# Patient Record
Sex: MALE | Race: WHITE | URBAN - METROPOLITAN AREA
[De-identification: names, ages, dates, MRNs, and addresses within clinical notes are randomized per-mention and may not be internally consistent; named-entity substitution may affect disease eponyms.]

---

## 2021-05-25 ENCOUNTER — RECORDS - HEALTHEAST (OUTPATIENT)
Dept: ADMINISTRATIVE | Facility: CLINIC | Age: 61
End: 2021-05-25

## 2021-06-01 ENCOUNTER — RECORDS - HEALTHEAST (OUTPATIENT)
Dept: ADMINISTRATIVE | Facility: CLINIC | Age: 61
End: 2021-06-01

## 2021-06-09 ENCOUNTER — RECORDS - HEALTHEAST (OUTPATIENT)
Dept: ADMINISTRATIVE | Facility: CLINIC | Age: 61
End: 2021-06-09

## 2025-05-26 ENCOUNTER — HOSPITAL ENCOUNTER (EMERGENCY)
Facility: CLINIC | Age: 65
Discharge: HOME | End: 2025-05-26
Attending: FAMILY MEDICINE | Admitting: FAMILY MEDICINE
Payer: COMMERCIAL

## 2025-05-26 ENCOUNTER — APPOINTMENT (OUTPATIENT)
Dept: GENERAL RADIOLOGY | Facility: CLINIC | Age: 65
End: 2025-05-26
Attending: FAMILY MEDICINE
Payer: COMMERCIAL

## 2025-05-26 ENCOUNTER — APPOINTMENT (OUTPATIENT)
Dept: CT IMAGING | Facility: CLINIC | Age: 65
End: 2025-05-26
Attending: FAMILY MEDICINE
Payer: COMMERCIAL

## 2025-05-26 VITALS
HEART RATE: 97 BPM | WEIGHT: 208 LBS | SYSTOLIC BLOOD PRESSURE: 133 MMHG | OXYGEN SATURATION: 96 % | RESPIRATION RATE: 18 BRPM | DIASTOLIC BLOOD PRESSURE: 75 MMHG

## 2025-05-26 DIAGNOSIS — S16.1XXA CERVICAL STRAIN, INITIAL ENCOUNTER: ICD-10-CM

## 2025-05-26 DIAGNOSIS — S42.001A CLOSED DISPLACED FRACTURE OF RIGHT CLAVICLE, UNSPECIFIED PART OF CLAVICLE, INITIAL ENCOUNTER: ICD-10-CM

## 2025-05-26 DIAGNOSIS — S09.90XA CLOSED HEAD INJURY, INITIAL ENCOUNTER: ICD-10-CM
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
## 2025-05-26 PROBLEM — I83.813 VARICOSE VEINS OF BOTH LOWER EXTREMITIES WITH PAIN: Status: ACTIVE | Noted: 2025-05-26

## 2025-05-26 LAB
ALBUMIN SERPL BCG-MCNC: 4.7 G/DL (ref 3.5–5.2)
ALP SERPL-CCNC: 117 U/L (ref 40–150)
ALT SERPL W P-5'-P-CCNC: 42 U/L (ref 0–70)
ANION GAP SERPL CALCULATED.3IONS-SCNC: 12 MMOL/L (ref 7–15)
AST SERPL W P-5'-P-CCNC: 50 U/L (ref 0–45)
BASOPHILS # BLD AUTO: 0 10E3/UL (ref 0–0.2)
BASOPHILS NFR BLD AUTO: 0 %
BILIRUB SERPL-MCNC: 0.9 MG/DL
BUN SERPL-MCNC: 12.7 MG/DL (ref 8–23)
CALCIUM SERPL-MCNC: 9.7 MG/DL (ref 8.8–10.4)
CHLORIDE SERPL-SCNC: 97 MMOL/L (ref 98–107)
CREAT SERPL-MCNC: 0.81 MG/DL (ref 0.67–1.17)
EGFRCR SERPLBLD CKD-EPI 2021: >90 ML/MIN/1.73M2
EOSINOPHIL # BLD AUTO: 0 10E3/UL (ref 0–0.7)
EOSINOPHIL NFR BLD AUTO: 0 %
ERYTHROCYTE [DISTWIDTH] IN BLOOD BY AUTOMATED COUNT: 12.8 % (ref 10–15)
GLUCOSE SERPL-MCNC: 275 MG/DL (ref 70–99)
HCO3 SERPL-SCNC: 25 MMOL/L (ref 22–29)
HCT VFR BLD AUTO: 46.3 % (ref 40–53)
HGB BLD-MCNC: 14.9 G/DL (ref 13.3–17.7)
HOLD SPECIMEN: NORMAL
HOLD SPECIMEN: NORMAL
IMM GRANULOCYTES # BLD: 0.2 10E3/UL
IMM GRANULOCYTES NFR BLD: 2 %
LYMPHOCYTES # BLD AUTO: 0.8 10E3/UL (ref 0.8–5.3)
LYMPHOCYTES NFR BLD AUTO: 5 %
MCH RBC QN AUTO: 28.3 PG (ref 26.5–33)
MCHC RBC AUTO-ENTMCNC: 32.2 G/DL (ref 31.5–36.5)
MCV RBC AUTO: 88 FL (ref 78–100)
MONOCYTES # BLD AUTO: 1 10E3/UL (ref 0–1.3)
MONOCYTES NFR BLD AUTO: 7 %
NEUTROPHILS # BLD AUTO: 13 10E3/UL (ref 1.6–8.3)
NEUTROPHILS NFR BLD AUTO: 86 %
NRBC # BLD AUTO: 0 10E3/UL
NRBC BLD AUTO-RTO: 0 /100
PLATELET # BLD AUTO: 300 10E3/UL (ref 150–450)
POTASSIUM SERPL-SCNC: 3.9 MMOL/L (ref 3.4–5.3)
PROT SERPL-MCNC: 7.2 G/DL (ref 6.4–8.3)
RBC # BLD AUTO: 5.27 10E6/UL (ref 4.4–5.9)
SODIUM SERPL-SCNC: 134 MMOL/L (ref 135–145)
WBC # BLD AUTO: 15.1 10E3/UL (ref 4–11)

## 2025-05-26 PROCEDURE — 96376 TX/PRO/DX INJ SAME DRUG ADON: CPT

## 2025-05-26 PROCEDURE — 250N000011 HC RX IP 250 OP 636: Performed by: FAMILY MEDICINE

## 2025-05-26 PROCEDURE — 72128 CT CHEST SPINE W/O DYE: CPT

## 2025-05-26 PROCEDURE — 36415 COLL VENOUS BLD VENIPUNCTURE: CPT | Performed by: FAMILY MEDICINE

## 2025-05-26 PROCEDURE — 71275 CT ANGIOGRAPHY CHEST: CPT

## 2025-05-26 PROCEDURE — 99291 CRITICAL CARE FIRST HOUR: CPT | Mod: 25

## 2025-05-26 PROCEDURE — 250N000011 HC RX IP 250 OP 636: Mod: JZ | Performed by: FAMILY MEDICINE

## 2025-05-26 PROCEDURE — 96375 TX/PRO/DX INJ NEW DRUG ADDON: CPT

## 2025-05-26 PROCEDURE — 96374 THER/PROPH/DIAG INJ IV PUSH: CPT | Mod: 59

## 2025-05-26 PROCEDURE — 82310 ASSAY OF CALCIUM: CPT | Performed by: FAMILY MEDICINE

## 2025-05-26 PROCEDURE — 73502 X-RAY EXAM HIP UNI 2-3 VIEWS: CPT

## 2025-05-26 PROCEDURE — 72131 CT LUMBAR SPINE W/O DYE: CPT

## 2025-05-26 PROCEDURE — 258N000003 HC RX IP 258 OP 636: Performed by: FAMILY MEDICINE

## 2025-05-26 PROCEDURE — 70450 CT HEAD/BRAIN W/O DYE: CPT

## 2025-05-26 PROCEDURE — 99285 EMERGENCY DEPT VISIT HI MDM: CPT | Performed by: FAMILY MEDICINE

## 2025-05-26 PROCEDURE — 96361 HYDRATE IV INFUSION ADD-ON: CPT

## 2025-05-26 PROCEDURE — 73030 X-RAY EXAM OF SHOULDER: CPT | Mod: RT

## 2025-05-26 PROCEDURE — 250N000009 HC RX 250: Performed by: FAMILY MEDICINE

## 2025-05-26 PROCEDURE — 85004 AUTOMATED DIFF WBC COUNT: CPT | Performed by: FAMILY MEDICINE

## 2025-05-26 PROCEDURE — 72125 CT NECK SPINE W/O DYE: CPT

## 2025-05-26 RX ORDER — OXYCODONE HYDROCHLORIDE 5 MG/1
5 TABLET ORAL EVERY 6 HOURS PRN
Qty: 10 TABLET | Refills: 0 | Status: SHIPPED | OUTPATIENT
Start: 2025-05-26

## 2025-05-26 RX ORDER — HYDROMORPHONE HYDROCHLORIDE 1 MG/ML
INJECTION, SOLUTION INTRAMUSCULAR; INTRAVENOUS; SUBCUTANEOUS
Status: COMPLETED
Start: 2025-05-26 | End: 2025-05-26

## 2025-05-26 RX ORDER — OXYCODONE HYDROCHLORIDE 5 MG/1
5 TABLET ORAL EVERY 6 HOURS PRN
Qty: 12 TABLET | Refills: 0 | Status: SHIPPED | OUTPATIENT
Start: 2025-05-26 | End: 2025-05-26

## 2025-05-26 RX ORDER — HYDROMORPHONE HYDROCHLORIDE 1 MG/ML
0.5 INJECTION, SOLUTION INTRAMUSCULAR; INTRAVENOUS; SUBCUTANEOUS
Refills: 0 | Status: DISCONTINUED | OUTPATIENT
Start: 2025-05-26 | End: 2025-05-26 | Stop reason: HOSPADM

## 2025-05-26 RX ORDER — HYDROMORPHONE HYDROCHLORIDE 1 MG/ML
0.5 INJECTION, SOLUTION INTRAMUSCULAR; INTRAVENOUS; SUBCUTANEOUS ONCE
Refills: 0 | Status: COMPLETED | OUTPATIENT
Start: 2025-05-26 | End: 2025-05-26

## 2025-05-26 RX ORDER — IOPAMIDOL 755 MG/ML
102 INJECTION, SOLUTION INTRAVASCULAR ONCE
Status: COMPLETED | OUTPATIENT
Start: 2025-05-26 | End: 2025-05-26

## 2025-05-26 RX ORDER — ONDANSETRON 2 MG/ML
4 INJECTION INTRAMUSCULAR; INTRAVENOUS ONCE
Status: COMPLETED | OUTPATIENT
Start: 2025-05-26 | End: 2025-05-26

## 2025-05-26 RX ADMIN — IOPAMIDOL 102 ML: 755 INJECTION, SOLUTION INTRAVENOUS at 14:52

## 2025-05-26 RX ADMIN — HYDROMORPHONE HYDROCHLORIDE 0.5 MG: 1 INJECTION, SOLUTION INTRAMUSCULAR; INTRAVENOUS; SUBCUTANEOUS at 13:40

## 2025-05-26 RX ADMIN — SODIUM CHLORIDE 100 ML: 9 INJECTION, SOLUTION INTRAVENOUS at 14:52

## 2025-05-26 RX ADMIN — ONDANSETRON 4 MG: 2 INJECTION, SOLUTION INTRAMUSCULAR; INTRAVENOUS at 13:38

## 2025-05-26 RX ADMIN — HYDROMORPHONE HYDROCHLORIDE 0.5 MG: 1 INJECTION, SOLUTION INTRAMUSCULAR; INTRAVENOUS; SUBCUTANEOUS at 15:03

## 2025-05-26 RX ADMIN — SODIUM CHLORIDE 1000 ML: 0.9 INJECTION, SOLUTION INTRAVENOUS at 13:42

## 2025-05-26 ASSESSMENT — COLUMBIA-SUICIDE SEVERITY RATING SCALE - C-SSRS
1. IN THE PAST MONTH, HAVE YOU WISHED YOU WERE DEAD OR WISHED YOU COULD GO TO SLEEP AND NOT WAKE UP?: NO
6. HAVE YOU EVER DONE ANYTHING, STARTED TO DO ANYTHING, OR PREPARED TO DO ANYTHING TO END YOUR LIFE?: NO
2. HAVE YOU ACTUALLY HAD ANY THOUGHTS OF KILLING YOURSELF IN THE PAST MONTH?: NO

## 2025-05-26 ASSESSMENT — ACTIVITIES OF DAILY LIVING (ADL)
ADLS_ACUITY_SCORE: 41

## 2025-05-26 NOTE — ED PROVIDER NOTES
Spoke with [facility] RN who requested change in prescription destination after patient was discharged with oxycodone sent to hospital pharmacy- now closed. To help facilitate care needs re-routed oxycodone prescription to [facility]. Patient was discharged with plan for pain management plan to include oxycodone as directed during ED care course. See ED note for further details of care provided. Patient was not seen or examined.     Angelo Muniz MD  05/26/25 [id number]

## 2025-05-26 NOTE — ED PROVIDER NOTES
"  History     Chief Complaint   Patient presents with    Fall   Shoulder pain, fall from ladder  HPI  Andrew J Lesch is a 65 year old male, past medical history significant for varicose veins, hyperlipidemia, diabetes, allergic rhinitis, sensorineural hearing loss, osteoarthritis, presents to the emergency department by way of the urgent care with concerns of primarily right shoulder pain after a fall from a ladder shortly prior to arrival.  The patient was seen in urgent care briefly and transferred over the emergency department where I saw him promptly as a trauma evaluation.  Initial history was obtained from the patient's wife who states that he was outside on a ladder about 10 feet up with a chainsaw trying to cut a branch when he thinks the branch snapped back hitting him in the right shoulder causing him to fall backwards off the ladder and landed on his back on the grass.  He does not know this for sure but his wife suspects that she found him actually on top of the ladder on his back, briefly stunned and not answering questions although he was breathing and his eyes were open he then \"snapped out of it\" and started complaining of pain in his right shoulder and a little bit in his neck and a little bit in his chest and abdomen.  The patient complains of a mild headache, his vision does seem a little bit blurry, primarily notes pain in his right shoulder, also little bit in the neck where he states he had a previous neck fracture that was managed nonoperatively, he identifies a little bit of discomfort in his right anterior chest and right upper abdomen area.  He also identifies pain in his right hip open was able to get up off the ground with minimal assistance from his wife and ambulate to the emergency department from parking lot from his car without difficulty.  He has taken nothing for pain.  He denies any shortness of breath nausea or vomiting.  Denies trauma to the lower extremities and has no pain in " those areas aside from the 1 area in the right hip.    Allergies:  Allergies   Allergen Reactions    Loperamide     Penicillins     Sulfa Antibiotics        Problem List:    Patient Active Problem List    Diagnosis Date Noted    Varicose veins of both lower extremities with pain 05/26/2025     Priority: Medium    Hyperlipidemia 07/23/2015     Priority: Medium    Diabetes mellitus (H) 08/26/2014     Priority: Medium    Allergic rhinitis 04/14/2014     Priority: Medium    Sensorineural hearing loss, unilateral 01/19/2012     Priority: Medium    Osteoarthritis of knee 05/29/2011     Priority: Medium        Past Medical History:    No past medical history on file.    Past Surgical History:    No past surgical history on file.    Family History:    No family history on file.    Social History:  Marital Status:   [2]        Medications:    oxyCODONE (ROXICODONE) 5 MG tablet          Review of Systems   All other systems reviewed and are negative.      Physical Exam   BP: 132/87  Pulse: 92  Resp: 18  Weight: 94.3 kg (208 lb)  SpO2: 96 %      Physical Exam  Vitals and nursing note reviewed.   Constitutional:       General: He is not in acute distress.     Appearance: Normal appearance. He is normal weight. He is not ill-appearing.   HENT:      Head: Normocephalic and atraumatic.      Comments: No hemotympanum, no raccoon's or magana's sign     Right Ear: Tympanic membrane, ear canal and external ear normal.      Left Ear: Tympanic membrane, ear canal and external ear normal.      Nose: Nose normal.      Mouth/Throat:      Mouth: Mucous membranes are dry.      Pharynx: Oropharynx is clear.   Eyes:      Extraocular Movements: Extraocular movements intact.      Conjunctiva/sclera: Conjunctivae normal.      Pupils: Pupils are equal, round, and reactive to light.   Neck:      Comments: Tender to palpation in the midline cervical spine.  C-collar to be placed  Cardiovascular:      Rate and Rhythm: Normal rate and regular  rhythm.      Pulses: Normal pulses.      Heart sounds: Normal heart sounds.   Pulmonary:      Effort: Pulmonary effort is normal.      Breath sounds: Normal breath sounds.   Chest:      Chest wall: Tenderness present.       Abdominal:      General: Bowel sounds are normal.      Palpations: Abdomen is soft.   Musculoskeletal:      Cervical back: Tenderness present.        Back:       Comments: Mildly tender to palpation diffusely in the area diagram   Skin:     General: Skin is warm and dry.      Capillary Refill: Capillary refill takes less than 2 seconds.   Neurological:      General: No focal deficit present.      Mental Status: He is alert and oriented to person, place, and time.         ED Course        Procedures                Results for orders placed or performed during the hospital encounter of 05/26/25 (from the past 24 hours)   [place] Draw    Narrative    The following orders were created for panel order [place] Draw.  Procedure                               Abnormality         Status                     ---------                               -----------         ------                     Extra Green Top (Lithiu...[[id number]]                      Final result               Extra Purple Top Tube[[id number]]                           Final result                 Please view results for these tests on the individual orders.   Extra Green Top (Lithium Heparin) Tube   Result Value Ref Range    Hold Specimen JIC    Extra Purple Top Tube   Result Value Ref Range    Hold Specimen JIC    CBC with platelets, differential    Narrative    The following orders were created for panel order CBC with platelets, differential.  Procedure                               Abnormality         Status                     ---------                               -----------         ------                     CBC with platelets and ...[[id number]]  Abnormal            Final result                 Please view results for these  tests on the individual orders.   Comprehensive metabolic panel   Result Value Ref Range    Sodium 134 (L) 135 - 145 mmol/L    Potassium 3.9 3.4 - 5.3 mmol/L    Carbon Dioxide (CO2) 25 22 - 29 mmol/L    Anion Gap 12 7 - 15 mmol/L    Urea Nitrogen 12.7 8.0 - 23.0 mg/dL    Creatinine 0.81 0.67 - 1.17 mg/dL    GFR Estimate >90 >60 mL/min/1.73m2    Calcium 9.7 8.8 - 10.4 mg/dL    Chloride 97 (L) 98 - 107 mmol/L    Glucose 275 (H) 70 - 99 mg/dL    Alkaline Phosphatase 117 40 - 150 U/L    AST 50 (H) 0 - 45 U/L    ALT 42 0 - 70 U/L    Protein Total 7.2 6.4 - 8.3 g/dL    Albumin 4.7 3.5 - 5.2 g/dL    Bilirubin Total 0.9 <=1.2 mg/dL   CBC with platelets and differential   Result Value Ref Range    WBC Count 15.1 (H) 4.0 - 11.0 10e3/uL    RBC Count 5.27 4.40 - 5.90 10e6/uL    Hemoglobin 14.9 13.3 - 17.7 g/dL    Hematocrit 46.3 40.0 - 53.0 %    MCV 88 78 - 100 fL    MCH 28.3 26.5 - 33.0 pg    MCHC 32.2 31.5 - 36.5 g/dL    RDW 12.8 10.0 - 15.0 %    Platelet Count 300 150 - 450 10e3/uL    % Neutrophils 86 %    % Lymphocytes 5 %    % Monocytes 7 %    % Eosinophils 0 %    % Basophils 0 %    % Immature Granulocytes 2 %    NRBCs per 100 WBC 0 <1 /100    Absolute Neutrophils 13.0 (H) 1.6 - 8.3 10e3/uL    Absolute Lymphocytes 0.8 0.8 - 5.3 10e3/uL    Absolute Monocytes 1.0 0.0 - 1.3 10e3/uL    Absolute Eosinophils 0.0 0.0 - 0.7 10e3/uL    Absolute Basophils 0.0 0.0 - 0.2 10e3/uL    Absolute Immature Granulocytes 0.2 <=0.4 10e3/uL    Absolute NRBCs 0.0 10e3/uL   Shoulder XR, 2 view, right    Narrative    EXAM: XR SHOULDER RIGHT 2 VIEWS  LOCATION: [facility]  DATE: 5/26/2025    INDICATION: Right shoulder pain, distal one third clavicle fracture suspected, fell from approximately 10 feet up a ladder  COMPARISON: None.      Impression    IMPRESSION: Acute mildly displaced fracture of the distal right clavicular shaft with slight inferior displacement of the distal clavicular fracture fragment. No angulation.    XR Hip Right 2-3 Views    Narrative    EXAM: XR HIP RIGHT 2-3 VIEWS  LOCATION: [facility]  DATE: 5/26/2025    INDICATION: Right hip pain, fell 10 feet off a ladder.  COMPARISON: 12/05/2023.      Impression    IMPRESSION:   Bones are demineralized. There is a small curvilinear bone fragment which appears to arise from the posterior acetabular rim which is chronic, present on the 12/05/2023 exam. No evidence of an acute displaced fracture. Minimal right hip arthrosis. No   dislocation.   XR Shoulder Left 2 Views    Narrative    EXAM: XR SHOULDER LEFT 2 VIEWS  LOCATION: [facility]  DATE: 5/26/2025    INDICATION: pain, fall from ladder  COMPARISON: None.      Impression    IMPRESSION: No fracture or dislocation. Mild degenerative changes at the AC joint.   CT Head w/o Contrast    Narrative    EXAM: CT HEAD W/O CONTRAST  LOCATION: [facility]  DATE: 5/26/2025    INDICATION: Trauma  COMPARISON: None.  TECHNIQUE: Routine CT Head without IV contrast. Multiplanar reformats. Dose reduction techniques were used.    FINDINGS:  INTRACRANIAL CONTENTS: No evidence of acute intracranial hemorrhage or mass effect. Foci of decreased attenuation within the cerebral hemispheric white matter which are nonspecific, though most commonly ascribed to chronic small vessel ischemic disease.   The ventricles and sulci are prominent consistent with mild brain parenchymal volume loss. Gray-white matter differentiation is maintained. The basilar cisterns are patent.    VISUALIZED ORBITS/SINUSES/MASTOIDS: The globes are unremarkable. The partially imaged paranasal sinuses, mastoid air cells and middle ear cavities are unremarkable.     BONES/SOFT TISSUES: The visualized skull base and calvarium are unremarkable.      Impression    IMPRESSION:    1.  No evidence of acute intracranial hemorrhage or mass effect.  2.  Mild nonspecific white matter changes.  3.  Mild  brain parenchymal volume loss.   CT Cervical Spine w/o Contrast    Narrative    EXAM: CT CERVICAL SPINE W/O CONTRAST  LOCATION: [facility]  DATE: 5/26/2025    INDICATION: Trauma  COMPARISON: None.  TECHNIQUE: Routine CT Cervical Spine without IV contrast. Multiplanar reformats. Dose reduction techniques were used.    FINDINGS:  No evidence of acute fracture or subluxation of the cervical spine by CT imaging. 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 Stature and alignment. Multilevel No extraspinal abnormality.     Craniovertebral junction and C1-C2: Normal.    C2-C3: No posterior disc bulge or spinal canal narrowing. No neural foraminal narrowing.     C3-C4: No posterior disc bulge or spinal canal narrowing. No neural foraminal narrowing.     C4-C5: No posterior disc bulge or spinal canal narrowing. Uncovertebral joint disease and facet arthropathy with severe bilateral neural foraminal narrowing.     C5-C6: Mild loss of disc height. Broad bar disc osteophyte complex with mild spinal canal narrowing. Uncovertebral joint disease and facet arthropathy with severe bilateral neural foraminal narrowing.    C6-C7: No posterior disc bulge or spinal canal narrowing. Uncovertebral joint disease and facet arthropathy with severe bilateral neural foraminal narrowing.     C7-T1: No posterior disc bulge or spinal canal narrowing. No neural foraminal narrowing.       Impression    IMPRESSION:  1.  No evidence of acute fracture or subluxation of the cervical spine by CT imaging.  2.  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  3.  Degenerative cervical spondylosis as described above..   CT Thoracic Spine w/o Contrast    Narrative    EXAM: CT THORACIC SPINE W/O CONTRAST, CT LUMBAR SPINE W/O CONTRAST  LOCATION: [facility]  DATE: 5/26/2025    INDICATION: Trauma  COMPARISON: None.  TECHNIQUE:   1.  Thoracic spine CT without  IV contrast. Dose reduction techniques were used.  2.  Lumbar spine CT without IV contrast. Dose reduction techniques were used.    FINDINGS:  Thoracic spine CT:  No evidence of acute fracture or subluxation of the thoracic spine by CT imaging. The vertebral bodies of the thoracic spine have normal stature and alignment. The disc spaces are well-maintained. No significant degenerative changes.    No significant posterior disc bulge, spinal canal, or neural foraminal narrowing at any level within the thoracic spine.    Lumbar spine CT:  No evidence of acute fracture or subluxation of the thoracic spine by CT imaging. The vertebral bodies of the thoracic spine have normal stature and alignment. Multilevel degenerative disc disease of the thoracic spine with disc height loss, most   pronounced at L4/L5 and L5/S1.    The partially imaged intra-abdominal contents are unremarkable.    T12/L1:  No posterior disc bulge or spinal canal narrowing. No neural foraminal narrowing.    L1/L2:  No posterior disc bulge or spinal canal narrowing. No neural foraminal narrowing.    L2/L3:  No posterior disc bulge or spinal canal narrowing. No neural foraminal narrowing.    L3/L4:  Symmetric disc bulge and moderate facet arthropathy with mild spinal canal narrowing. Mild bilateral neural foraminal narrowing.      L4/L5:  Symmetric disc bulge and moderate facet arthropathy with mild spinal canal narrowing. Mild bilateral neural foraminal narrowing.     L5/S1: Symmetric disc bulge and moderate facet arthropathy without significant spinal canal narrowing. Severe bilateral neural foraminal narrowing.       Impression    IMPRESSION:    Thoracic Spine CT  1.  No evidence of acute fracture or subluxation of the thoracic spine by CT imaging.    Lumbar Spine CT:  1.  No evidence of acute fracture or subluxation of the lumbar spine by CT imaging.  2.  Degenerative lumbar spondylosis as described above.   CT Lumbar Spine w/o Contrast    Narrative     EXAM: CT THORACIC SPINE W/O CONTRAST, CT LUMBAR SPINE W/O CONTRAST  LOCATION: [facility]  DATE: 5/26/2025    INDICATION: Trauma  COMPARISON: None.  TECHNIQUE:   1.  Thoracic spine CT without IV contrast. Dose reduction techniques were used.  2.  Lumbar spine CT without IV contrast. Dose reduction techniques were used.    FINDINGS:  Thoracic spine CT:  No evidence of acute fracture or subluxation of the thoracic spine by CT imaging. The vertebral bodies of the thoracic spine have normal stature and alignment. The disc spaces are well-maintained. No significant degenerative changes.    No significant posterior disc bulge, spinal canal, or neural foraminal narrowing at any level within the thoracic spine.    Lumbar spine CT:  No evidence of acute fracture or subluxation of the thoracic spine by CT imaging. The vertebral bodies of the thoracic spine have normal stature and alignment. Multilevel degenerative disc disease of the thoracic spine with disc height loss, most   pronounced at L4/L5 and L5/S1.    The partially imaged intra-abdominal contents are unremarkable.    T12/L1:  No posterior disc bulge or spinal canal narrowing. No neural foraminal narrowing.    L1/L2:  No posterior disc bulge or spinal canal narrowing. No neural foraminal narrowing.    L2/L3:  No posterior disc bulge or spinal canal narrowing. No neural foraminal narrowing.    L3/L4:  Symmetric disc bulge and moderate facet arthropathy with mild spinal canal narrowing. Mild bilateral neural foraminal narrowing.      L4/L5:  Symmetric disc bulge and moderate facet arthropathy with mild spinal canal narrowing. Mild bilateral neural foraminal narrowing.     L5/S1: Symmetric disc bulge and moderate facet arthropathy without significant spinal canal narrowing. Severe bilateral neural foraminal narrowing.       Impression    IMPRESSION:    Thoracic Spine CT  1.  No evidence of acute fracture or subluxation of the thoracic spine  by CT imaging.    Lumbar Spine CT:  1.  No evidence of acute fracture or subluxation of the lumbar spine by CT imaging.  2.  Degenerative lumbar spondylosis as described above.       Medications   HYDROmorphone (PF) (DILAUDID) injection 0.5 mg (0.5 mg Intravenous $Given 5/26/25 1503)   HYDROmorphone (PF) (DILAUDID) injection 0.5 mg (0.5 mg Intravenous $Given 5/26/25 1340)   ondansetron (ZOFRAN) injection 4 mg (4 mg Intravenous $Given 5/26/25 1338)   sodium chloride 0.9% BOLUS 1,000 mL (1,000 mLs Intravenous $New Bag 5/26/25 1342)   sodium chloride 0.9 % bag for CT scan flush (100 mLs Intravenous $Given 5/26/25 1452)   iopamidol (ISOVUE-370) solution 102 mL (102 mLs Intravenous $Given 5/26/25 1452)   4:40 PM  At this time with all imaging studies with the exception of the CT chest pelvis with contrast are completed with impressions per radiology.  I have also independently reviewed all imaging studies and agree with radiologist interpretation with respect to the x-ray left shoulder 2 views, x-ray right shoulder 2 views demonstrating clavicle fracture, CT head without contrast without acute traumatic findings, CT cervical spine without contrast demonstrating no acute traumatic findings, CT lumbar spine without contrast demonstrating no acute traumatic findings, CT thoracic spine without contrast demonstrating no acute findings, x-ray of the right hip showing possible old posterior acetabular fracture.  No acute fracture.  Discussed these at length with the patient and his wife and answered their questions specifically with respect to follow-up for the clavicle fracture orthopedic  placed.  Tylenol/ibuprofen as pain medication for baseline and oxycodone for breakthrough pain.  Return criteria for the emergency department discussed.      4:42 PM  [place]    Assessments & Plan (with Medical Decision Making)     I have reviewed the nursing notes.    I have reviewed the findings, diagnosis, plan and need for follow  up with the patient.        New Prescriptions    OXYCODONE (ROXICODONE) 5 MG TABLET    Take 1 tablet (5 mg) by mouth every 6 hours as needed.       Final diagnoses:   Fall from ladder, initial encounter   Closed displaced fracture of right clavicle, unspecified part of clavicle, initial encounter   Closed head injury, initial encounter   Cervical strain, initial encounter       5/26/2025   [facility] EMERGENCY DEPT

## 2025-05-26 NOTE — ED NOTES
61-year-old male initially presented to urgent care with concerns over fall from ladder.  Patient fell from height of approximately 6 feet after branch knocked him backwards.  He did report loss of consciousness and has incomplete memory of events.  He continues to complain of left shoulder pain.  Discussed with patient typical scope evaluation available in urgent care, mechanism of injury and concern for LOC and memory loss did recommend ED evaluation. Patient was amenable to plan.  Transferred to ED waiting.     Disclaimer: This note consists of symbols derived from keyboarding, dictation, and/or voice recognition software. As a result, there may be errors in the script that have gone undetected.  Please consider this when interpreting information found in the chart.     Cathy Lamar, KACEY  05/26/25 [id number]

## 2025-05-26 NOTE — ED TRIAGE NOTES
Patient arrived from home to ED with spouse after falling approximately 10 ft off ladder while cutting tree branches.  Patient complains of right shoulder, right hip, and right side of head pain.  Patient lost consciousness.  Patient's spouse found patient unconscious on top of ladder.  Patient not on blood thinners.  Trauma eval called.  C-collar applied.     Triage Assessment (Adult)       Row Name 05/26/25 1318          Triage Assessment    Airway WDL WDL        Respiratory WDL    Respiratory WDL WDL        Skin Circulation/Temperature WDL    Skin Circulation/Temperature WDL WDL        Cardiac WDL    Cardiac WDL WDL        Peripheral/Neurovascular WDL    Peripheral Neurovascular WDL WDL        Cognitive/Neuro/Behavioral WDL    Cognitive/Neuro/Behavioral WDL WDL

## 2025-05-26 NOTE — DISCHARGE INSTRUCTIONS
Shoulder immobilizer and follow-up in clinic with orthopedics this coming week.   referral placed.  Tylenol/ibuprofen as needed for pain at baseline.  You may use the oxycodone prescribed for breakthrough pain as needed.

## 2025-06-03 ENCOUNTER — OFFICE VISIT (OUTPATIENT)
Dept: ORTHOPEDICS | Facility: CLINIC | Age: 65
End: 2025-06-03
Attending: FAMILY MEDICINE
Payer: COMMERCIAL

## 2025-06-03 ENCOUNTER — ANCILLARY PROCEDURE (OUTPATIENT)
Dept: GENERAL RADIOLOGY | Facility: CLINIC | Age: 65
End: 2025-06-03
Attending: FAMILY MEDICINE
Payer: COMMERCIAL

## 2025-06-03 VITALS — WEIGHT: 208 LBS | BODY MASS INDEX: 26.69 KG/M2 | HEIGHT: 74 IN

## 2025-06-03 DIAGNOSIS — M54.2 CERVICALGIA: Primary | ICD-10-CM

## 2025-06-03 DIAGNOSIS — S06.0X1A CONCUSSION WITH LOSS OF CONSCIOUSNESS OF 30 MINUTES OR LESS, INITIAL ENCOUNTER: ICD-10-CM

## 2025-06-03 DIAGNOSIS — S42.001A CLOSED DISPLACED FRACTURE OF RIGHT CLAVICLE, UNSPECIFIED PART OF CLAVICLE, INITIAL ENCOUNTER: ICD-10-CM

## 2025-06-03 DIAGNOSIS — M62.838 MUSCLE SPASM: ICD-10-CM

## 2025-06-03 PROCEDURE — 73000 X-RAY EXAM OF COLLAR BONE: CPT | Mod: TC | Performed by: RADIOLOGY

## 2025-06-03 PROCEDURE — 99204 OFFICE O/P NEW MOD 45 MIN: CPT | Performed by: FAMILY MEDICINE

## 2025-06-03 RX ORDER — LANOLIN ALCOHOL/MO/W.PET/CERES
1000 CREAM (GRAM) TOPICAL
COMMUNITY
Start: 2025-05-06

## 2025-06-03 RX ORDER — CYCLOBENZAPRINE HCL 10 MG
5-10 TABLET ORAL 3 TIMES DAILY PRN
Qty: 60 TABLET | Refills: 0 | Status: SHIPPED | OUTPATIENT
Start: 2025-06-03

## 2025-06-03 RX ORDER — ROSUVASTATIN CALCIUM 20 MG/1
20 TABLET, COATED ORAL AT BEDTIME
COMMUNITY
Start: 2024-12-18

## 2025-06-03 RX ORDER — LOSARTAN POTASSIUM 25 MG/1
12.5 TABLET ORAL DAILY
COMMUNITY

## 2025-06-03 NOTE — PROGRESS NOTES
"Andrew J Lesch  :  1960  DOS: 6/3/2025  MRN: [id number]    Sports Medicine Clinic Visit      Andrew J Lesch is a 65 year old Right hand dominant male who is seen in consultation at the request of  Andrew Andersen M.D., as an ER referral presenting with bilateral shoulder injuries.    Injury: Patient describes injury as cutting limb from tree, fell ~ 8 feet from ladder landing on ground/shoulder, spouse states that he was initially found unconscious that occurred on 2025.  Pain located over right clavicle midshaft, left shoulder AC joint, right generalized rib/sternal pain, nonradiating.  Additional Features:  Positive: weakness and bony deformity.  Symptoms are better with activity modification, tylenol, oxycodone.  Symptoms are worse with: moving either shoulder, direct pressure, deep inhalation, lying on his back.  Other evaluation and/or treatments so far consists of: Ice, Tylenol, Ibuprofen, Other medications: Oxycodone, Rest, and ER visit, shoulder immobilizer.  Recent imaging completed: CT scan & Xray of shoulders, chest, & head completed 2025.  Prior History of related problems: none    Social History: retired    Review of Systems  Musculoskeletal: as above  Remainder of review of systems is negative including constitutional, CV, pulmonary, GI, Skin and Neurologic except as noted in HPI or medical history.    No past medical history on file.  No past surgical history on file.  No family history on file.    Objective  Ht 1.88 m (6' 2\")   Wt 94.3 kg (208 lb)   BMI 26.71 kg/m      General: healthy, alert and in no distress    HEENT: no scleral icterus or conjunctival erythema   Skin: no suspicious lesions or rash. No jaundice.   CV: regular rhythm by palpation, 2+ distal pulses, no pedal edema    Resp: normal respiratory effort without conversational dyspnea   Psych: normal mood and affect    Gait: nonantalgic, appropriate coordination and balance   Neuro: normal light touch sensory " exam of the extremities. Motor strength as noted below     Bilateral Shoulder exam    ROM:        Full active and passive ROM with flexion, extension, abduction, internal and external rotation on the right with mild pain in terminal ROM on the left  Significantly limited ROM on the left due to known fracture, limited testing    Tender:        acromioclavicular joint and acromion left       Midshaft clavicle right most focal, surrounding ecchymosis draining down anterior chest    Non Tender:       remainder of shoulder       subacromial space       posterior shoulder       periscapular region    Strength:        Motor function intact, limited testing on the right due to fracture    Impingement testing on the left:        neg (-) Neer       neg (-) Arevalo       neg (-) empty can       neg (-) crossover       neg (-) crank    Stability testing:       neg (-) anterior glide       neg (-) sulcus sign    Skin:       no visible deformities       well perfused       capillary refill brisk    Sensation:        normal sensation over shoulder and upper extremity       Radiology  Recent Results (from the past 744 hours)   Shoulder XR, 2 view, right    Narrative    EXAM: XR SHOULDER RIGHT 2 VIEWS  LOCATION: [facility]  DATE: 5/26/2025    INDICATION: Right shoulder pain, distal one third clavicle fracture suspected, fell from approximately 10 feet up a ladder  COMPARISON: None.      Impression    IMPRESSION: Acute mildly displaced fracture of the distal right clavicular shaft with slight inferior displacement of the distal clavicular fracture fragment. No angulation.   XR Hip Right 2-3 Views    Narrative    EXAM: XR HIP RIGHT 2-3 VIEWS  LOCATION: [facility]  DATE: 5/26/2025    INDICATION: Right hip pain, fell 10 feet off a ladder.  COMPARISON: 12/05/2023.      Impression    IMPRESSION:   Bones are demineralized. There is a small curvilinear bone fragment which appears to  arise from the posterior acetabular rim which is chronic, present on the 12/05/2023 exam. No evidence of an acute displaced fracture. Minimal right hip arthrosis. No   dislocation.   XR Shoulder Left 2 Views    Narrative    EXAM: XR SHOULDER LEFT 2 VIEWS  LOCATION: [facility]  DATE: 5/26/2025    INDICATION: pain, fall from ladder  COMPARISON: None.      Impression    IMPRESSION: No fracture or dislocation. Mild degenerative changes at the AC joint.   CT Head w/o Contrast    Narrative    EXAM: CT HEAD W/O CONTRAST  LOCATION: [facility]  DATE: 5/26/2025    INDICATION: Trauma  COMPARISON: None.  TECHNIQUE: Routine CT Head without IV contrast. Multiplanar reformats. Dose reduction techniques were used.    FINDINGS:  INTRACRANIAL CONTENTS: No evidence of acute intracranial hemorrhage or mass effect. Foci of decreased attenuation within the cerebral hemispheric white matter which are nonspecific, though most commonly ascribed to chronic small vessel ischemic disease.   The ventricles and sulci are prominent consistent with mild brain parenchymal volume loss. Gray-white matter differentiation is maintained. The basilar cisterns are patent.    VISUALIZED ORBITS/SINUSES/MASTOIDS: The globes are unremarkable. The partially imaged paranasal sinuses, mastoid air cells and middle ear cavities are unremarkable.     BONES/SOFT TISSUES: The visualized skull base and calvarium are unremarkable.      Impression    IMPRESSION:    1.  No evidence of acute intracranial hemorrhage or mass effect.  2.  Mild nonspecific white matter changes.  3.  Mild brain parenchymal volume loss.   CT Cervical Spine w/o Contrast    Narrative    EXAM: CT CERVICAL SPINE W/O CONTRAST  LOCATION: [facility]  DATE: 5/26/2025    INDICATION: Trauma  COMPARISON: None.  TECHNIQUE: Routine CT Cervical Spine without IV contrast. Multiplanar reformats. Dose reduction techniques were  used.    FINDINGS:  No evidence of acute fracture or subluxation of the cervical spine by CT imaging. 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 Stature and alignment. Multilevel No extraspinal abnormality.     Craniovertebral junction and C1-C2: Normal.    C2-C3: No posterior disc bulge or spinal canal narrowing. No neural foraminal narrowing.     C3-C4: No posterior disc bulge or spinal canal narrowing. No neural foraminal narrowing.     C4-C5: No posterior disc bulge or spinal canal narrowing. Uncovertebral joint disease and facet arthropathy with severe bilateral neural foraminal narrowing.     C5-C6: Mild loss of disc height. Broad bar disc osteophyte complex with mild spinal canal narrowing. Uncovertebral joint disease and facet arthropathy with severe bilateral neural foraminal narrowing.    C6-C7: No posterior disc bulge or spinal canal narrowing. Uncovertebral joint disease and facet arthropathy with severe bilateral neural foraminal narrowing.     C7-T1: No posterior disc bulge or spinal canal narrowing. No neural foraminal narrowing.       Impression    IMPRESSION:  1.  No evidence of acute fracture or subluxation of the cervical spine by CT imaging.  2.  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  3.  Degenerative cervical spondylosis as described above..   CT Thoracic Spine w/o Contrast    Narrative    EXAM: CT THORACIC SPINE W/O CONTRAST, CT LUMBAR SPINE W/O CONTRAST  LOCATION: [facility]  DATE: 5/26/2025    INDICATION: Trauma  COMPARISON: None.  TECHNIQUE:   1.  Thoracic spine CT without IV contrast. Dose reduction techniques were used.  2.  Lumbar spine CT without IV contrast. Dose reduction techniques were used.    FINDINGS:  Thoracic spine CT:  No evidence of acute fracture or subluxation of the thoracic spine by CT imaging. The vertebral bodies of the thoracic spine have normal stature and alignment. The disc spaces  are well-maintained. No significant degenerative changes.    No significant posterior disc bulge, spinal canal, or neural foraminal narrowing at any level within the thoracic spine.    Lumbar spine CT:  No evidence of acute fracture or subluxation of the thoracic spine by CT imaging. The vertebral bodies of the thoracic spine have normal stature and alignment. Multilevel degenerative disc disease of the thoracic spine with disc height loss, most   pronounced at L4/L5 and L5/S1.    The partially imaged intra-abdominal contents are unremarkable.    T12/L1:  No posterior disc bulge or spinal canal narrowing. No neural foraminal narrowing.    L1/L2:  No posterior disc bulge or spinal canal narrowing. No neural foraminal narrowing.    L2/L3:  No posterior disc bulge or spinal canal narrowing. No neural foraminal narrowing.    L3/L4:  Symmetric disc bulge and moderate facet arthropathy with mild spinal canal narrowing. Mild bilateral neural foraminal narrowing.      L4/L5:  Symmetric disc bulge and moderate facet arthropathy with mild spinal canal narrowing. Mild bilateral neural foraminal narrowing.     L5/S1: Symmetric disc bulge and moderate facet arthropathy without significant spinal canal narrowing. Severe bilateral neural foraminal narrowing.       Impression    IMPRESSION:    Thoracic Spine CT  1.  No evidence of acute fracture or subluxation of the thoracic spine by CT imaging.    Lumbar Spine CT:  1.  No evidence of acute fracture or subluxation of the lumbar spine by CT imaging.  2.  Degenerative lumbar spondylosis as described above.   CT Lumbar Spine w/o Contrast    Narrative    EXAM: CT THORACIC SPINE W/O CONTRAST, CT LUMBAR SPINE W/O CONTRAST  LOCATION: [facility]  DATE: 5/26/2025    INDICATION: Trauma  COMPARISON: None.  TECHNIQUE:   1.  Thoracic spine CT without IV contrast. Dose reduction techniques were used.  2.  Lumbar spine CT without IV contrast. Dose reduction techniques  were used.    FINDINGS:  Thoracic spine CT:  No evidence of acute fracture or subluxation of the thoracic spine by CT imaging. The vertebral bodies of the thoracic spine have normal stature and alignment. The disc spaces are well-maintained. No significant degenerative changes.    No significant posterior disc bulge, spinal canal, or neural foraminal narrowing at any level within the thoracic spine.    Lumbar spine CT:  No evidence of acute fracture or subluxation of the thoracic spine by CT imaging. The vertebral bodies of the thoracic spine have normal stature and alignment. Multilevel degenerative disc disease of the thoracic spine with disc height loss, most   pronounced at L4/L5 and L5/S1.    The partially imaged intra-abdominal contents are unremarkable.    T12/L1:  No posterior disc bulge or spinal canal narrowing. No neural foraminal narrowing.    L1/L2:  No posterior disc bulge or spinal canal narrowing. No neural foraminal narrowing.    L2/L3:  No posterior disc bulge or spinal canal narrowing. No neural foraminal narrowing.    L3/L4:  Symmetric disc bulge and moderate facet arthropathy with mild spinal canal narrowing. Mild bilateral neural foraminal narrowing.      L4/L5:  Symmetric disc bulge and moderate facet arthropathy with mild spinal canal narrowing. Mild bilateral neural foraminal narrowing.     L5/S1: Symmetric disc bulge and moderate facet arthropathy without significant spinal canal narrowing. Severe bilateral neural foraminal narrowing.       Impression    IMPRESSION:    Thoracic Spine CT  1.  No evidence of acute fracture or subluxation of the thoracic spine by CT imaging.    Lumbar Spine CT:  1.  No evidence of acute fracture or subluxation of the lumbar spine by CT imaging.  2.  Degenerative lumbar spondylosis as described above.   CT Chest PE Abdomen Pelvis w Contrast    Narrative    EXAM: CT CHEST PE ABDOMEN PELVIS W CONTRAST  LOCATION: [facility]  DATE:  5/26/2025    INDICATION: Trauma  COMPARISON: None.  TECHNIQUE: CT chest pulmonary angiogram and routine CT abdomen pelvis with IV contrast. Arterial phase through the chest and venous phase through the abdomen and pelvis. Multiplanar reformats and MIP reconstructions were performed. Dose reduction   techniques were used.   CONTRAST: 102 mL Isovue 370    FINDINGS:  ANGIOGRAM CHEST: Pulmonary arteries are normal caliber and negative for pulmonary emboli. Thoracic aorta is negative for dissection. No CT evidence of right heart strain.     LUNGS AND PLEURA: Moderate to marked elevation right hemidiaphragm with right basilar atelectasis. Dependent atelectasis in the posterior lung bases. Respiratory motion obscures some details. No actionable pulmonary nodules. Otherwise, Lungs are clear.   No pleural effusions.    MEDIASTINUM/AXILLAE: No lymphadenopathy. No thoracic aortic aneurysms. No pericardial effusion. Esophagus unremarkable. Visualized thyroid unremarkable.    CORONARY ARTERY CALCIFICATION: Cannot evaluate.    HEPATOBILIARY: Diffuse hepatic steatosis. No significant mass. No bile duct dilatation. No calcified gallstones.    PANCREAS: No significant mass, duct dilatation, or inflammatory change.    SPLEEN: Normal size.    ADRENAL GLANDS: No significant nodules.    KIDNEYS/BLADDER: The bilateral kidneys enhance symmetrically without evidence for hydronephrosis or pyelonephritis. No renal masses. No renal calculi. The bilateral ureters and urinary bladder are unremarkable.    BOWEL:  Nonspecific nonobstructive bowel gas pattern. No inflammatory changes. Appendix not seen but no definite evidence for appendicitis. Further management of suspected appendicitis should be based on physical examination and clinical judgment.    LYMPH NODES: No lymphadenopathy.    VASCULATURE: No abdominal aortic aneurysm. Mild vascular calcifications abdominal aorta.    PELVIC ORGANS: No pelvic masses or free fluid.    MUSCULOSKELETAL:  Mild to moderate spondylosis. No definite acute fractures identified. No definite inguinal hernia. No ventral hernia.      Impression    IMPRESSION:  1.  No evidence for pulmonary embolism.  2.  Moderate to marked elevation right hemidiaphragm with right basilar atelectasis.  3.  No acute findings in the abdomen or pelvis.  4.  Hepatic steatosis.         Assessment:  1. Cervicalgia    2. Closed displaced fracture of right clavicle, unspecified part of clavicle, initial encounter    3. Muscle spasm    4. Concussion with loss of consciousness of 30 minutes or less, initial encounter        Plan:  Discussed the assessment with the patient.  Follow up: 2 weeks, as he is going on vacation next week  Has a planned fishing trip to [place], reviewed use of appropriate caution if traveling given the relatively unstable fracture  Pain coming under better control  Various imaging reviewed today  CT and XR images independently visualized and reviewed with patient today in clinic  Acute mildly foreshortened and moderately displaced, comminuted midshaft clavicle fracture  Contusion only for the left shoulder which remains highly function and overall reassuring on exam  Pain control measures reviewed, he has been ok with Tylenol, reviewed potential use of Voltaren gel  New imaging today suggests largely maintained position, but some suggestion of mild migration since prior imaging  He notes that there has been crepitus when he moves his arm  Fragments still appear in acceptable position for conservative care, but will discuss with orthopedic surgery to be sure  Continue sling/immobilizer for now, will start to encourage more ROM at next visit, reviewed basics if he tries before next visit  Reviewed anticipation of 3 mo recovery, and will more completely assess RTC when fracture improves and functional testing is more realistic  Also reporting that he lost consciousness with the fall, and has concussion like sx since  Reviewed  general principles of relative brain rest, concussion  referral placed today,   Advised no driving for now  Has tolerated flexeril in the past, reviewed potential SE of drowsiness especially with concussion sx  He would like to have on hand for pain control and help with sleep if needed, rx provided today  Reviewed modest dosing to start for safety, he and wife both acknowledged, and he has also not been drinking alcohol since the injury  supportive care reviewed  All questions were answered today  Contact us with additional questions or concerns  Signs and sx of concern reviewed      Osvaldo Davis DO, RONALD  Sports Medicine Physician  [facility]        Disclaimer: This note consists of symbols derived from keyboarding, dictation and/or voice recognition software. As a result, there may be errors in the script that have gone undetected. Please consider this when interpreting information found in this chart.

## 2025-06-03 NOTE — LETTER
"6/3/2025      Andrew J Lesch  [address]  [place] [address]      Dear Colleague,    Thank you for referring your patient, Andrew J Lesch, to the [facility] [place]. Please see a copy of my visit note below.    Andrew J Lesch  :  1960  DOS: 6/3/2025  MRN: [id number]    Sports Medicine Clinic Visit      Andrew J Lesch is a 65 year old Right hand dominant male who is seen in consultation at the request of  Andrew Andersen M.D., as an ER referral presenting with bilateral shoulder injuries.    Injury: Patient describes injury as cutting limb from tree, fell ~ 8 feet from ladder landing on ground/shoulder, spouse states that he was initially found unconscious that occurred on 2025.  Pain located over right clavicle midshaft, left shoulder AC joint, right generalized rib/sternal pain, nonradiating.  Additional Features:  Positive: weakness and bony deformity.  Symptoms are better with activity modification, tylenol, oxycodone.  Symptoms are worse with: moving either shoulder, direct pressure, deep inhalation, lying on his back.  Other evaluation and/or treatments so far consists of: Ice, Tylenol, Ibuprofen, Other medications: Oxycodone, Rest, and ER visit, shoulder immobilizer.  Recent imaging completed: CT scan & Xray of shoulders, chest, & head completed 2025.  Prior History of related problems: none    Social History: retired    Review of Systems  Musculoskeletal: as above  Remainder of review of systems is negative including constitutional, CV, pulmonary, GI, Skin and Neurologic except as noted in HPI or medical history.    No past medical history on file.  No past surgical history on file.  No family history on file.    Objective  Ht 1.88 m (6' 2\")   Wt 94.3 kg (208 lb)   BMI 26.71 kg/m      General: healthy, alert and in no distress    HEENT: no scleral icterus or conjunctival erythema   Skin: no suspicious lesions or rash. No jaundice.   CV: regular " rhythm by palpation, 2+ distal pulses, no pedal edema    Resp: normal respiratory effort without conversational dyspnea   Psych: normal mood and affect    Gait: nonantalgic, appropriate coordination and balance   Neuro: normal light touch sensory exam of the extremities. Motor strength as noted below     Bilateral Shoulder exam    ROM:        Full active and passive ROM with flexion, extension, abduction, internal and external rotation on the right with mild pain in terminal ROM on the left  Significantly limited ROM on the left due to known fracture, limited testing    Tender:        acromioclavicular joint and acromion left       Midshaft clavicle right most focal, surrounding ecchymosis draining down anterior chest    Non Tender:       remainder of shoulder       subacromial space       posterior shoulder       periscapular region    Strength:        Motor function intact, limited testing on the right due to fracture    Impingement testing on the left:        neg (-) Neer       neg (-) Arevalo       neg (-) empty can       neg (-) crossover       neg (-) crank    Stability testing:       neg (-) anterior glide       neg (-) sulcus sign    Skin:       no visible deformities       well perfused       capillary refill brisk    Sensation:        normal sensation over shoulder and upper extremity       Radiology  Recent Results (from the past 744 hours)   Shoulder XR, 2 view, right    Narrative    EXAM: XR SHOULDER RIGHT 2 VIEWS  LOCATION: [facility]  DATE: 5/26/2025    INDICATION: Right shoulder pain, distal one third clavicle fracture suspected, fell from approximately 10 feet up a ladder  COMPARISON: None.      Impression    IMPRESSION: Acute mildly displaced fracture of the distal right clavicular shaft with slight inferior displacement of the distal clavicular fracture fragment. No angulation.   XR Hip Right 2-3 Views    Narrative    EXAM: XR HIP RIGHT 2-3 VIEWS  LOCATION: [facility]  [facility]  DATE: 5/26/2025    INDICATION: Right hip pain, fell 10 feet off a ladder.  COMPARISON: 12/05/2023.      Impression    IMPRESSION:   Bones are demineralized. There is a small curvilinear bone fragment which appears to arise from the posterior acetabular rim which is chronic, present on the 12/05/2023 exam. No evidence of an acute displaced fracture. Minimal right hip arthrosis. No   dislocation.   XR Shoulder Left 2 Views    Narrative    EXAM: XR SHOULDER LEFT 2 VIEWS  LOCATION: [facility]  DATE: 5/26/2025    INDICATION: pain, fall from ladder  COMPARISON: None.      Impression    IMPRESSION: No fracture or dislocation. Mild degenerative changes at the AC joint.   CT Head w/o Contrast    Narrative    EXAM: CT HEAD W/O CONTRAST  LOCATION: [facility]  DATE: 5/26/2025    INDICATION: Trauma  COMPARISON: None.  TECHNIQUE: Routine CT Head without IV contrast. Multiplanar reformats. Dose reduction techniques were used.    FINDINGS:  INTRACRANIAL CONTENTS: No evidence of acute intracranial hemorrhage or mass effect. Foci of decreased attenuation within the cerebral hemispheric white matter which are nonspecific, though most commonly ascribed to chronic small vessel ischemic disease.   The ventricles and sulci are prominent consistent with mild brain parenchymal volume loss. Gray-white matter differentiation is maintained. The basilar cisterns are patent.    VISUALIZED ORBITS/SINUSES/MASTOIDS: The globes are unremarkable. The partially imaged paranasal sinuses, mastoid air cells and middle ear cavities are unremarkable.     BONES/SOFT TISSUES: The visualized skull base and calvarium are unremarkable.      Impression    IMPRESSION:    1.  No evidence of acute intracranial hemorrhage or mass effect.  2.  Mild nonspecific white matter changes.  3.  Mild brain parenchymal volume loss.   CT Cervical Spine w/o Contrast    Narrative    EXAM: CT  CERVICAL SPINE W/O CONTRAST  LOCATION: [facility]  DATE: 5/26/2025    INDICATION: Trauma  COMPARISON: None.  TECHNIQUE: Routine CT Cervical Spine without IV contrast. Multiplanar reformats. Dose reduction techniques were used.    FINDINGS:  No evidence of acute fracture or subluxation of the cervical spine by CT imaging. 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 Stature and alignment. Multilevel No extraspinal abnormality.     Craniovertebral junction and C1-C2: Normal.    C2-C3: No posterior disc bulge or spinal canal narrowing. No neural foraminal narrowing.     C3-C4: No posterior disc bulge or spinal canal narrowing. No neural foraminal narrowing.     C4-C5: No posterior disc bulge or spinal canal narrowing. Uncovertebral joint disease and facet arthropathy with severe bilateral neural foraminal narrowing.     C5-C6: Mild loss of disc height. Broad bar disc osteophyte complex with mild spinal canal narrowing. Uncovertebral joint disease and facet arthropathy with severe bilateral neural foraminal narrowing.    C6-C7: No posterior disc bulge or spinal canal narrowing. Uncovertebral joint disease and facet arthropathy with severe bilateral neural foraminal narrowing.     C7-T1: No posterior disc bulge or spinal canal narrowing. No neural foraminal narrowing.       Impression    IMPRESSION:  1.  No evidence of acute fracture or subluxation of the cervical spine by CT imaging.  2.  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  3.  Degenerative cervical spondylosis as described above..   CT Thoracic Spine w/o Contrast    Narrative    EXAM: CT THORACIC SPINE W/O CONTRAST, CT LUMBAR SPINE W/O CONTRAST  LOCATION: [facility]  DATE: 5/26/2025    INDICATION: Trauma  COMPARISON: None.  TECHNIQUE:   1.  Thoracic spine CT without IV contrast. Dose reduction techniques were used.  2.  Lumbar spine CT without IV  contrast. Dose reduction techniques were used.    FINDINGS:  Thoracic spine CT:  No evidence of acute fracture or subluxation of the thoracic spine by CT imaging. The vertebral bodies of the thoracic spine have normal stature and alignment. The disc spaces are well-maintained. No significant degenerative changes.    No significant posterior disc bulge, spinal canal, or neural foraminal narrowing at any level within the thoracic spine.    Lumbar spine CT:  No evidence of acute fracture or subluxation of the thoracic spine by CT imaging. The vertebral bodies of the thoracic spine have normal stature and alignment. Multilevel degenerative disc disease of the thoracic spine with disc height loss, most   pronounced at L4/L5 and L5/S1.    The partially imaged intra-abdominal contents are unremarkable.    T12/L1:  No posterior disc bulge or spinal canal narrowing. No neural foraminal narrowing.    L1/L2:  No posterior disc bulge or spinal canal narrowing. No neural foraminal narrowing.    L2/L3:  No posterior disc bulge or spinal canal narrowing. No neural foraminal narrowing.    L3/L4:  Symmetric disc bulge and moderate facet arthropathy with mild spinal canal narrowing. Mild bilateral neural foraminal narrowing.      L4/L5:  Symmetric disc bulge and moderate facet arthropathy with mild spinal canal narrowing. Mild bilateral neural foraminal narrowing.     L5/S1: Symmetric disc bulge and moderate facet arthropathy without significant spinal canal narrowing. Severe bilateral neural foraminal narrowing.       Impression    IMPRESSION:    Thoracic Spine CT  1.  No evidence of acute fracture or subluxation of the thoracic spine by CT imaging.    Lumbar Spine CT:  1.  No evidence of acute fracture or subluxation of the lumbar spine by CT imaging.  2.  Degenerative lumbar spondylosis as described above.   CT Lumbar Spine w/o Contrast    Narrative    EXAM: CT THORACIC SPINE W/O CONTRAST, CT LUMBAR SPINE W/O CONTRAST  LOCATION: [facility]  DATE: 5/26/2025    INDICATION: Trauma  COMPARISON: None.  TECHNIQUE:   1.  Thoracic spine CT without IV contrast. Dose reduction techniques were used.  2.  Lumbar spine CT without IV contrast. Dose reduction techniques were used.    FINDINGS:  Thoracic spine CT:  No evidence of acute fracture or subluxation of the thoracic spine by CT imaging. The vertebral bodies of the thoracic spine have normal stature and alignment. The disc spaces are well-maintained. No significant degenerative changes.    No significant posterior disc bulge, spinal canal, or neural foraminal narrowing at any level within the thoracic spine.    Lumbar spine CT:  No evidence of acute fracture or subluxation of the thoracic spine by CT imaging. The vertebral bodies of the thoracic spine have normal stature and alignment. Multilevel degenerative disc disease of the thoracic spine with disc height loss, most   pronounced at L4/L5 and L5/S1.    The partially imaged intra-abdominal contents are unremarkable.    T12/L1:  No posterior disc bulge or spinal canal narrowing. No neural foraminal narrowing.    L1/L2:  No posterior disc bulge or spinal canal narrowing. No neural foraminal narrowing.    L2/L3:  No posterior disc bulge or spinal canal narrowing. No neural foraminal narrowing.    L3/L4:  Symmetric disc bulge and moderate facet arthropathy with mild spinal canal narrowing. Mild bilateral neural foraminal narrowing.      L4/L5:  Symmetric disc bulge and moderate facet arthropathy with mild spinal canal narrowing. Mild bilateral neural foraminal narrowing.     L5/S1: Symmetric disc bulge and moderate facet arthropathy without significant spinal canal narrowing. Severe bilateral neural foraminal narrowing.       Impression    IMPRESSION:    Thoracic Spine CT  1.  No evidence of acute fracture or subluxation of the thoracic spine by CT imaging.    Lumbar Spine CT:  1.  No evidence of acute fracture or  subluxation of the lumbar spine by CT imaging.  2.  Degenerative lumbar spondylosis as described above.   CT Chest PE Abdomen Pelvis w Contrast    Narrative    EXAM: CT CHEST PE ABDOMEN PELVIS W CONTRAST  LOCATION: [facility]  DATE: 5/26/2025    INDICATION: Trauma  COMPARISON: None.  TECHNIQUE: CT chest pulmonary angiogram and routine CT abdomen pelvis with IV contrast. Arterial phase through the chest and venous phase through the abdomen and pelvis. Multiplanar reformats and MIP reconstructions were performed. Dose reduction   techniques were used.   CONTRAST: 102 mL Isovue 370    FINDINGS:  ANGIOGRAM CHEST: Pulmonary arteries are normal caliber and negative for pulmonary emboli. Thoracic aorta is negative for dissection. No CT evidence of right heart strain.     LUNGS AND PLEURA: Moderate to marked elevation right hemidiaphragm with right basilar atelectasis. Dependent atelectasis in the posterior lung bases. Respiratory motion obscures some details. No actionable pulmonary nodules. Otherwise, Lungs are clear.   No pleural effusions.    MEDIASTINUM/AXILLAE: No lymphadenopathy. No thoracic aortic aneurysms. No pericardial effusion. Esophagus unremarkable. Visualized thyroid unremarkable.    CORONARY ARTERY CALCIFICATION: Cannot evaluate.    HEPATOBILIARY: Diffuse hepatic steatosis. No significant mass. No bile duct dilatation. No calcified gallstones.    PANCREAS: No significant mass, duct dilatation, or inflammatory change.    SPLEEN: Normal size.    ADRENAL GLANDS: No significant nodules.    KIDNEYS/BLADDER: The bilateral kidneys enhance symmetrically without evidence for hydronephrosis or pyelonephritis. No renal masses. No renal calculi. The bilateral ureters and urinary bladder are unremarkable.    BOWEL:  Nonspecific nonobstructive bowel gas pattern. No inflammatory changes. Appendix not seen but no definite evidence for appendicitis. Further management of suspected appendicitis  should be based on physical examination and clinical judgment.    LYMPH NODES: No lymphadenopathy.    VASCULATURE: No abdominal aortic aneurysm. Mild vascular calcifications abdominal aorta.    PELVIC ORGANS: No pelvic masses or free fluid.    MUSCULOSKELETAL: Mild to moderate spondylosis. No definite acute fractures identified. No definite inguinal hernia. No ventral hernia.      Impression    IMPRESSION:  1.  No evidence for pulmonary embolism.  2.  Moderate to marked elevation right hemidiaphragm with right basilar atelectasis.  3.  No acute findings in the abdomen or pelvis.  4.  Hepatic steatosis.         Assessment:  1. Cervicalgia    2. Closed displaced fracture of right clavicle, unspecified part of clavicle, initial encounter    3. Muscle spasm    4. Concussion with loss of consciousness of 30 minutes or less, initial encounter        Plan:  Discussed the assessment with the patient.  Follow up: 2 weeks, as he is going on vacation next week  Has a planned fishing trip to [place], reviewed use of appropriate caution if traveling given the relatively unstable fracture  Pain coming under better control  Various imaging reviewed today  CT and XR images independently visualized and reviewed with patient today in clinic  Acute mildly foreshortened and moderately displaced, comminuted midshaft clavicle fracture  Contusion only for the left shoulder which remains highly function and overall reassuring on exam  Pain control measures reviewed, he has been ok with Tylenol, reviewed potential use of Voltaren gel  New imaging today suggests largely maintained position, but some suggestion of mild migration since prior imaging  He notes that there has been crepitus when he moves his arm  Fragments still appear in acceptable position for conservative care, but will discuss with orthopedic surgery to be sure  Continue sling/immobilizer for now, will start to encourage more ROM at next visit, reviewed basics if he tries  before next visit  Reviewed anticipation of 3 mo recovery, and will more completely assess RTC when fracture improves and functional testing is more realistic  Also reporting that he lost consciousness with the fall, and has concussion like sx since  Reviewed general principles of relative brain rest, concussion  referral placed today,   Advised no driving for now  Has tolerated flexeril in the past, reviewed potential SE of drowsiness especially with concussion sx  He would like to have on hand for pain control and help with sleep if needed, rx provided today  Reviewed modest dosing to start for safety, he and wife both acknowledged, and he has also not been drinking alcohol since the injury  supportive care reviewed  All questions were answered today  Contact us with additional questions or concerns  Signs and sx of concern reviewed      Ovsaldo Davis DO, RONALD  Sports Medicine Physician  [facility]        Disclaimer: This note consists of symbols derived from keyboarding, dictation and/or voice recognition software. As a result, there may be errors in the script that have gone undetected. Please consider this when interpreting information found in this chart.    Again, thank you for allowing me to participate in the care of your patient.        Sincerely,        Osvaldo Davis DO    Electronically signed

## 2025-06-04 ENCOUNTER — PATIENT OUTREACH (OUTPATIENT)
Dept: CARE COORDINATION | Facility: CLINIC | Age: 65
End: 2025-06-04
Payer: COMMERCIAL

## 2025-06-05 ENCOUNTER — RESULTS FOLLOW-UP (OUTPATIENT)
Dept: ORTHOPEDICS | Facility: CLINIC | Age: 65
End: 2025-06-05

## 2025-06-08 ENCOUNTER — HEALTH MAINTENANCE LETTER (OUTPATIENT)
Age: 65
End: 2025-06-08

## 2025-06-15 SDOH — HEALTH STABILITY: PHYSICAL HEALTH: ON AVERAGE, HOW MANY MINUTES DO YOU ENGAGE IN EXERCISE AT THIS LEVEL?: PATIENT DECLINED

## 2025-06-15 SDOH — HEALTH STABILITY: PHYSICAL HEALTH
ON AVERAGE, HOW MANY DAYS PER WEEK DO YOU ENGAGE IN MODERATE TO STRENUOUS EXERCISE (LIKE A BRISK WALK)?: PATIENT DECLINED

## 2025-06-17 ENCOUNTER — ANCILLARY PROCEDURE (OUTPATIENT)
Dept: GENERAL RADIOLOGY | Facility: CLINIC | Age: 65
End: 2025-06-17
Attending: FAMILY MEDICINE
Payer: COMMERCIAL

## 2025-06-17 ENCOUNTER — OFFICE VISIT (OUTPATIENT)
Dept: ORTHOPEDICS | Facility: CLINIC | Age: 65
End: 2025-06-17
Payer: COMMERCIAL

## 2025-06-17 DIAGNOSIS — S06.0X1D CONCUSSION WITH LOSS OF CONSCIOUSNESS OF 30 MINUTES OR LESS, SUBSEQUENT ENCOUNTER: ICD-10-CM

## 2025-06-17 DIAGNOSIS — S49.91XD INJURY OF RIGHT SHOULDER, SUBSEQUENT ENCOUNTER: ICD-10-CM

## 2025-06-17 DIAGNOSIS — S42.001D CLOSED DISPLACED FRACTURE OF RIGHT CLAVICLE WITH ROUTINE HEALING, UNSPECIFIED PART OF CLAVICLE, SUBSEQUENT ENCOUNTER: Primary | ICD-10-CM

## 2025-06-17 DIAGNOSIS — M62.838 MUSCLE SPASM: ICD-10-CM

## 2025-06-17 DIAGNOSIS — M54.2 CERVICALGIA: ICD-10-CM

## 2025-06-17 DIAGNOSIS — S42.001A CLOSED DISPLACED FRACTURE OF RIGHT CLAVICLE, UNSPECIFIED PART OF CLAVICLE, INITIAL ENCOUNTER: ICD-10-CM

## 2025-06-17 PROCEDURE — 73000 X-RAY EXAM OF COLLAR BONE: CPT | Mod: TC | Performed by: STUDENT IN AN ORGANIZED HEALTH CARE EDUCATION/TRAINING PROGRAM

## 2025-06-17 PROCEDURE — 99213 OFFICE O/P EST LOW 20 MIN: CPT | Performed by: FAMILY MEDICINE

## 2025-06-17 NOTE — LETTER
2025      Andrew J Lesch  [address]  [place] [address]      Dear Colleague,    Thank you for referring your patient, Andrew J Lesch, to the [facility] [place]. Please see a copy of my visit note below.    Andrew J Lesch  :  1960  DOS: 25  MRN: [id number]    Sports Medicine Clinic Visit      Andrew J Lesch is a 65 year old Right hand dominant male who is seen in consultation at the request of  Andrew Andersen M.D., as an ER referral presenting with bilateral shoulder injuries.    Injury: Patient describes injury as cutting limb from tree, fell ~ 8 feet from ladder landing on ground/shoulder, spouse states that he was initially found unconscious that occurred on 2025.  Pain located over right clavicle midshaft, left shoulder AC joint, right generalized rib/sternal pain, nonradiating.  Additional Features:  Positive: weakness and bony deformity.  Symptoms are better with activity modification, tylenol, oxycodone.  Symptoms are worse with: moving either shoulder, direct pressure, deep inhalation, lying on his back.  Other evaluation and/or treatments so far consists of: Ice, Tylenol, Ibuprofen, Other medications: Oxycodone, Rest, and ER visit, shoulder immobilizer.  Recent imaging completed: CT scan & Xray of shoulders, chest, & head completed 2025.  Prior History of related problems: none    Social History: retired    Interim History 2025  Andrew J Lesch is now 3 weeks out from injury.  Since last visit on 6/3/2025 patient improving bilateral shoulder pain.  Notes that is leaving arm out of immobilizer at home due to comfort.  Mild left sided c-spine muscle tension that possibly attributing to his dizziness.   Using OTC pain medications as needed.  He wondering about skin irritation in right axilla region.      Review of Systems  Musculoskeletal: as above  Remainder of review of systems is negative including constitutional, CV,  pulmonary, GI, Skin and Neurologic except as noted in HPI or medical history.    No past medical history on file.  No past surgical history on file.  No family history on file.    Objective  There were no vitals taken for this visit.    General: healthy, alert and in no distress    HEENT: no scleral icterus or conjunctival erythema   Skin: no suspicious lesions or rash. No jaundice.   CV: regular rhythm by palpation, 2+ distal pulses, no pedal edema    Resp: normal respiratory effort without conversational dyspnea   Psych: normal mood and affect    Gait: nonantalgic, appropriate coordination and balance   Neuro: normal light touch sensory exam of the extremities. Motor strength as noted below     Bilateral Shoulder exam    ROM:        Full active and passive ROM with flexion, extension, abduction, internal and external rotation on the right with mild pain in terminal ROM on the left  limited but improving ROM on the left due to known fracture, incomplete testing    Tender:        acromioclavicular joint and acromion left, improving       Midshaft clavicle right most focal but also improving, surrounding ecchymosis draining down anterior chest improved    Non Tender:       remainder of shoulder       subacromial space       posterior shoulder       periscapular region    Strength:        Motor function intact, limited testing on the right due to fracture    Stability testing:       neg (-) anterior glide       neg (-) sulcus sign    Skin:       no visible deformities       well perfused       capillary refill brisk    Sensation:        normal sensation over shoulder and upper extremity       Radiology  Recent Results (from the past 744 hours)   Shoulder XR, 2 view, right    Narrative    EXAM: XR SHOULDER RIGHT 2 VIEWS  LOCATION: [facility]  DATE: 5/26/2025    INDICATION: Right shoulder pain, distal one third clavicle fracture suspected, fell from approximately 10 feet up a  ladder  COMPARISON: None.      Impression    IMPRESSION: Acute mildly displaced fracture of the distal right clavicular shaft with slight inferior displacement of the distal clavicular fracture fragment. No angulation.   XR Hip Right 2-3 Views    Narrative    EXAM: XR HIP RIGHT 2-3 VIEWS  LOCATION: [facility]  DATE: 5/26/2025    INDICATION: Right hip pain, fell 10 feet off a ladder.  COMPARISON: 12/05/2023.      Impression    IMPRESSION:   Bones are demineralized. There is a small curvilinear bone fragment which appears to arise from the posterior acetabular rim which is chronic, present on the 12/05/2023 exam. No evidence of an acute displaced fracture. Minimal right hip arthrosis. No   dislocation.   XR Shoulder Left 2 Views    Narrative    EXAM: XR SHOULDER LEFT 2 VIEWS  LOCATION: [facility]  DATE: 5/26/2025    INDICATION: pain, fall from ladder  COMPARISON: None.      Impression    IMPRESSION: No fracture or dislocation. Mild degenerative changes at the AC joint.   CT Head w/o Contrast    Narrative    EXAM: CT HEAD W/O CONTRAST  LOCATION: [facility]  DATE: 5/26/2025    INDICATION: Trauma  COMPARISON: None.  TECHNIQUE: Routine CT Head without IV contrast. Multiplanar reformats. Dose reduction techniques were used.    FINDINGS:  INTRACRANIAL CONTENTS: No evidence of acute intracranial hemorrhage or mass effect. Foci of decreased attenuation within the cerebral hemispheric white matter which are nonspecific, though most commonly ascribed to chronic small vessel ischemic disease.   The ventricles and sulci are prominent consistent with mild brain parenchymal volume loss. Gray-white matter differentiation is maintained. The basilar cisterns are patent.    VISUALIZED ORBITS/SINUSES/MASTOIDS: The globes are unremarkable. The partially imaged paranasal sinuses, mastoid air cells and middle ear cavities are unremarkable.     BONES/SOFT  TISSUES: The visualized skull base and calvarium are unremarkable.      Impression    IMPRESSION:    1.  No evidence of acute intracranial hemorrhage or mass effect.  2.  Mild nonspecific white matter changes.  3.  Mild brain parenchymal volume loss.   CT Cervical Spine w/o Contrast    Narrative    EXAM: CT CERVICAL SPINE W/O CONTRAST  LOCATION: [facility]  DATE: 5/26/2025    INDICATION: Trauma  COMPARISON: None.  TECHNIQUE: Routine CT Cervical Spine without IV contrast. Multiplanar reformats. Dose reduction techniques were used.    FINDINGS:  No evidence of acute fracture or subluxation of the cervical spine by CT imaging. 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 Stature and alignment. Multilevel No extraspinal abnormality.     Craniovertebral junction and C1-C2: Normal.    C2-C3: No posterior disc bulge or spinal canal narrowing. No neural foraminal narrowing.     C3-C4: No posterior disc bulge or spinal canal narrowing. No neural foraminal narrowing.     C4-C5: No posterior disc bulge or spinal canal narrowing. Uncovertebral joint disease and facet arthropathy with severe bilateral neural foraminal narrowing.     C5-C6: Mild loss of disc height. Broad bar disc osteophyte complex with mild spinal canal narrowing. Uncovertebral joint disease and facet arthropathy with severe bilateral neural foraminal narrowing.    C6-C7: No posterior disc bulge or spinal canal narrowing. Uncovertebral joint disease and facet arthropathy with severe bilateral neural foraminal narrowing.     C7-T1: No posterior disc bulge or spinal canal narrowing. No neural foraminal narrowing.       Impression    IMPRESSION:  1.  No evidence of acute fracture or subluxation of the cervical spine by CT imaging.  2.  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  3.  Degenerative cervical spondylosis as described above..   CT Thoracic Spine w/o Contrast    Narrative     EXAM: CT THORACIC SPINE W/O CONTRAST, CT LUMBAR SPINE W/O CONTRAST  LOCATION: [facility]  DATE: 5/26/2025    INDICATION: Trauma  COMPARISON: None.  TECHNIQUE:   1.  Thoracic spine CT without IV contrast. Dose reduction techniques were used.  2.  Lumbar spine CT without IV contrast. Dose reduction techniques were used.    FINDINGS:  Thoracic spine CT:  No evidence of acute fracture or subluxation of the thoracic spine by CT imaging. The vertebral bodies of the thoracic spine have normal stature and alignment. The disc spaces are well-maintained. No significant degenerative changes.    No significant posterior disc bulge, spinal canal, or neural foraminal narrowing at any level within the thoracic spine.    Lumbar spine CT:  No evidence of acute fracture or subluxation of the thoracic spine by CT imaging. The vertebral bodies of the thoracic spine have normal stature and alignment. Multilevel degenerative disc disease of the thoracic spine with disc height loss, most   pronounced at L4/L5 and L5/S1.    The partially imaged intra-abdominal contents are unremarkable.    T12/L1:  No posterior disc bulge or spinal canal narrowing. No neural foraminal narrowing.    L1/L2:  No posterior disc bulge or spinal canal narrowing. No neural foraminal narrowing.    L2/L3:  No posterior disc bulge or spinal canal narrowing. No neural foraminal narrowing.    L3/L4:  Symmetric disc bulge and moderate facet arthropathy with mild spinal canal narrowing. Mild bilateral neural foraminal narrowing.      L4/L5:  Symmetric disc bulge and moderate facet arthropathy with mild spinal canal narrowing. Mild bilateral neural foraminal narrowing.     L5/S1: Symmetric disc bulge and moderate facet arthropathy without significant spinal canal narrowing. Severe bilateral neural foraminal narrowing.       Impression    IMPRESSION:    Thoracic Spine CT  1.  No evidence of acute fracture or subluxation of the thoracic spine by  CT imaging.    Lumbar Spine CT:  1.  No evidence of acute fracture or subluxation of the lumbar spine by CT imaging.  2.  Degenerative lumbar spondylosis as described above.   CT Lumbar Spine w/o Contrast    Narrative    EXAM: CT THORACIC SPINE W/O CONTRAST, CT LUMBAR SPINE W/O CONTRAST  LOCATION: [facility]  DATE: 5/26/2025    INDICATION: Trauma  COMPARISON: None.  TECHNIQUE:   1.  Thoracic spine CT without IV contrast. Dose reduction techniques were used.  2.  Lumbar spine CT without IV contrast. Dose reduction techniques were used.    FINDINGS:  Thoracic spine CT:  No evidence of acute fracture or subluxation of the thoracic spine by CT imaging. The vertebral bodies of the thoracic spine have normal stature and alignment. The disc spaces are well-maintained. No significant degenerative changes.    No significant posterior disc bulge, spinal canal, or neural foraminal narrowing at any level within the thoracic spine.    Lumbar spine CT:  No evidence of acute fracture or subluxation of the thoracic spine by CT imaging. The vertebral bodies of the thoracic spine have normal stature and alignment. Multilevel degenerative disc disease of the thoracic spine with disc height loss, most   pronounced at L4/L5 and L5/S1.    The partially imaged intra-abdominal contents are unremarkable.    T12/L1:  No posterior disc bulge or spinal canal narrowing. No neural foraminal narrowing.    L1/L2:  No posterior disc bulge or spinal canal narrowing. No neural foraminal narrowing.    L2/L3:  No posterior disc bulge or spinal canal narrowing. No neural foraminal narrowing.    L3/L4:  Symmetric disc bulge and moderate facet arthropathy with mild spinal canal narrowing. Mild bilateral neural foraminal narrowing.      L4/L5:  Symmetric disc bulge and moderate facet arthropathy with mild spinal canal narrowing. Mild bilateral neural foraminal narrowing.     L5/S1: Symmetric disc bulge and moderate facet  arthropathy without significant spinal canal narrowing. Severe bilateral neural foraminal narrowing.       Impression    IMPRESSION:    Thoracic Spine CT  1.  No evidence of acute fracture or subluxation of the thoracic spine by CT imaging.    Lumbar Spine CT:  1.  No evidence of acute fracture or subluxation of the lumbar spine by CT imaging.  2.  Degenerative lumbar spondylosis as described above.   CT Chest PE Abdomen Pelvis w Contrast    Narrative    EXAM: CT CHEST PE ABDOMEN PELVIS W CONTRAST  LOCATION: [facility]  DATE: 5/26/2025    INDICATION: Trauma  COMPARISON: None.  TECHNIQUE: CT chest pulmonary angiogram and routine CT abdomen pelvis with IV contrast. Arterial phase through the chest and venous phase through the abdomen and pelvis. Multiplanar reformats and MIP reconstructions were performed. Dose reduction   techniques were used.   CONTRAST: 102 mL Isovue 370    FINDINGS:  ANGIOGRAM CHEST: Pulmonary arteries are normal caliber and negative for pulmonary emboli. Thoracic aorta is negative for dissection. No CT evidence of right heart strain.     LUNGS AND PLEURA: Moderate to marked elevation right hemidiaphragm with right basilar atelectasis. Dependent atelectasis in the posterior lung bases. Respiratory motion obscures some details. No actionable pulmonary nodules. Otherwise, Lungs are clear.   No pleural effusions.    MEDIASTINUM/AXILLAE: No lymphadenopathy. No thoracic aortic aneurysms. No pericardial effusion. Esophagus unremarkable. Visualized thyroid unremarkable.    CORONARY ARTERY CALCIFICATION: Cannot evaluate.    HEPATOBILIARY: Diffuse hepatic steatosis. No significant mass. No bile duct dilatation. No calcified gallstones.    PANCREAS: No significant mass, duct dilatation, or inflammatory change.    SPLEEN: Normal size.    ADRENAL GLANDS: No significant nodules.    KIDNEYS/BLADDER: The bilateral kidneys enhance symmetrically without evidence for hydronephrosis or  pyelonephritis. No renal masses. No renal calculi. The bilateral ureters and urinary bladder are unremarkable.    BOWEL:  Nonspecific nonobstructive bowel gas pattern. No inflammatory changes. Appendix not seen but no definite evidence for appendicitis. Further management of suspected appendicitis should be based on physical examination and clinical judgment.    LYMPH NODES: No lymphadenopathy.    VASCULATURE: No abdominal aortic aneurysm. Mild vascular calcifications abdominal aorta.    PELVIC ORGANS: No pelvic masses or free fluid.    MUSCULOSKELETAL: Mild to moderate spondylosis. No definite acute fractures identified. No definite inguinal hernia. No ventral hernia.      Impression    IMPRESSION:  1.  No evidence for pulmonary embolism.  2.  Moderate to marked elevation right hemidiaphragm with right basilar atelectasis.  3.  No acute findings in the abdomen or pelvis.  4.  Hepatic steatosis.         Assessment:  1. Closed displaced fracture of right clavicle, unspecified part of clavicle, initial encounter        Plan:  Discussed the assessment with the patient.  Follow up: 3 weeks  Pain under better control, not requiring much OTC medication  Has weaned from shoulder immobilizer and feels better out of it  Various imaging reviewed today including new images  CT and XR images independently visualized and reviewed with patient today in clinic  Acute mildly foreshortened and moderately displaced, comminuted midshaft clavicle fracture appears in more anatomic alignment today, and it is feeling better as well  Contusion only for the left shoulder which remains highly function and overall reassuring on exam  Pain control measures reviewed, he has been ok with Tylenol, reviewed potential use of Voltaren gel  OK to use sling/immobilizer for now, continue to work on passive and active gentle ROM, reviewed HEP today  Likely plan for PT after next visit  Reviewed anticipation of 3 mo recovery, and will more completely  assess RTC when fracture improves and functional testing is more realistic  Also reporting that he lost consciousness with the fall, and has concussion like sx since  Reviewed general principles of relative brain rest, concussion  referral placed last visit, has appt tomorrow  Advised no driving for now  Supportive care reviewed  All questions were answered today  Contact us with additional questions or concerns  Signs and sx of concern reviewed      Osvaldo Davis DO, CAQ  Sports Medicine Physician  [facility]        Disclaimer: This note consists of symbols derived from keyboarding, dictation and/or voice recognition software. As a result, there may be errors in the script that have gone undetected. Please consider this when interpreting information found in this chart.    Again, thank you for allowing me to participate in the care of your patient.        Sincerely,        Osvaldo Davis DO    Electronically signed

## 2025-06-17 NOTE — PROGRESS NOTES
Andrew J Lesch  :  1960  DOS: 25  MRN: [id number]    Sports Medicine Clinic Visit      Andrew J Lesch is a 65 year old Right hand dominant male who is seen in consultation at the request of  Andrew Andersen M.D., as an ER referral presenting with bilateral shoulder injuries.    Injury: Patient describes injury as cutting limb from tree, fell ~ 8 feet from ladder landing on ground/shoulder, spouse states that he was initially found unconscious that occurred on 2025.  Pain located over right clavicle midshaft, left shoulder AC joint, right generalized rib/sternal pain, nonradiating.  Additional Features:  Positive: weakness and bony deformity.  Symptoms are better with activity modification, tylenol, oxycodone.  Symptoms are worse with: moving either shoulder, direct pressure, deep inhalation, lying on his back.  Other evaluation and/or treatments so far consists of: Ice, Tylenol, Ibuprofen, Other medications: Oxycodone, Rest, and ER visit, shoulder immobilizer.  Recent imaging completed: CT scan & Xray of shoulders, chest, & head completed 2025.  Prior History of related problems: none    Social History: retired    Interim History 2025  Andrew J Lesch is now 3 weeks out from injury.  Since last visit on 6/3/2025 patient improving bilateral shoulder pain.  Notes that is leaving arm out of immobilizer at home due to comfort.  Mild left sided c-spine muscle tension that possibly attributing to his dizziness.   Using OTC pain medications as needed.  He wondering about skin irritation in right axilla region.      Review of Systems  Musculoskeletal: as above  Remainder of review of systems is negative including constitutional, CV, pulmonary, GI, Skin and Neurologic except as noted in HPI or medical history.    No past medical history on file.  No past surgical history on file.  No family history on file.    Objective  There were no vitals taken for this visit.    General: healthy,  alert and in no distress    HEENT: no scleral icterus or conjunctival erythema   Skin: no suspicious lesions or rash. No jaundice.   CV: regular rhythm by palpation, 2+ distal pulses, no pedal edema    Resp: normal respiratory effort without conversational dyspnea   Psych: normal mood and affect    Gait: nonantalgic, appropriate coordination and balance   Neuro: normal light touch sensory exam of the extremities. Motor strength as noted below     Bilateral Shoulder exam    ROM:        Full active and passive ROM with flexion, extension, abduction, internal and external rotation on the right with mild pain in terminal ROM on the left  limited but improving ROM on the left due to known fracture, incomplete testing    Tender:        acromioclavicular joint and acromion left, improving       Midshaft clavicle right most focal but also improving, surrounding ecchymosis draining down anterior chest improved    Non Tender:       remainder of shoulder       subacromial space       posterior shoulder       periscapular region    Strength:        Motor function intact, limited testing on the right due to fracture    Stability testing:       neg (-) anterior glide       neg (-) sulcus sign    Skin:       no visible deformities       well perfused       capillary refill brisk    Sensation:        normal sensation over shoulder and upper extremity       Radiology  Recent Results (from the past 744 hours)   Shoulder XR, 2 view, right    Narrative    EXAM: XR SHOULDER RIGHT 2 VIEWS  LOCATION: [facility]  DATE: 5/26/2025    INDICATION: Right shoulder pain, distal one third clavicle fracture suspected, fell from approximately 10 feet up a ladder  COMPARISON: None.      Impression    IMPRESSION: Acute mildly displaced fracture of the distal right clavicular shaft with slight inferior displacement of the distal clavicular fracture fragment. No angulation.   XR Hip Right 2-3 Views    Narrative    EXAM: XR  HIP RIGHT 2-3 VIEWS  LOCATION: [facility]  DATE: 5/26/2025    INDICATION: Right hip pain, fell 10 feet off a ladder.  COMPARISON: 12/05/2023.      Impression    IMPRESSION:   Bones are demineralized. There is a small curvilinear bone fragment which appears to arise from the posterior acetabular rim which is chronic, present on the 12/05/2023 exam. No evidence of an acute displaced fracture. Minimal right hip arthrosis. No   dislocation.   XR Shoulder Left 2 Views    Narrative    EXAM: XR SHOULDER LEFT 2 VIEWS  LOCATION: [facility]  DATE: 5/26/2025    INDICATION: pain, fall from ladder  COMPARISON: None.      Impression    IMPRESSION: No fracture or dislocation. Mild degenerative changes at the AC joint.   CT Head w/o Contrast    Narrative    EXAM: CT HEAD W/O CONTRAST  LOCATION: [facility]  DATE: 5/26/2025    INDICATION: Trauma  COMPARISON: None.  TECHNIQUE: Routine CT Head without IV contrast. Multiplanar reformats. Dose reduction techniques were used.    FINDINGS:  INTRACRANIAL CONTENTS: No evidence of acute intracranial hemorrhage or mass effect. Foci of decreased attenuation within the cerebral hemispheric white matter which are nonspecific, though most commonly ascribed to chronic small vessel ischemic disease.   The ventricles and sulci are prominent consistent with mild brain parenchymal volume loss. Gray-white matter differentiation is maintained. The basilar cisterns are patent.    VISUALIZED ORBITS/SINUSES/MASTOIDS: The globes are unremarkable. The partially imaged paranasal sinuses, mastoid air cells and middle ear cavities are unremarkable.     BONES/SOFT TISSUES: The visualized skull base and calvarium are unremarkable.      Impression    IMPRESSION:    1.  No evidence of acute intracranial hemorrhage or mass effect.  2.  Mild nonspecific white matter changes.  3.  Mild brain parenchymal volume loss.   CT Cervical Spine  w/o Contrast    Narrative    EXAM: CT CERVICAL SPINE W/O CONTRAST  LOCATION: [facility]  DATE: 5/26/2025    INDICATION: Trauma  COMPARISON: None.  TECHNIQUE: Routine CT Cervical Spine without IV contrast. Multiplanar reformats. Dose reduction techniques were used.    FINDINGS:  No evidence of acute fracture or subluxation of the cervical spine by CT imaging. 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 Stature and alignment. Multilevel No extraspinal abnormality.     Craniovertebral junction and C1-C2: Normal.    C2-C3: No posterior disc bulge or spinal canal narrowing. No neural foraminal narrowing.     C3-C4: No posterior disc bulge or spinal canal narrowing. No neural foraminal narrowing.     C4-C5: No posterior disc bulge or spinal canal narrowing. Uncovertebral joint disease and facet arthropathy with severe bilateral neural foraminal narrowing.     C5-C6: Mild loss of disc height. Broad bar disc osteophyte complex with mild spinal canal narrowing. Uncovertebral joint disease and facet arthropathy with severe bilateral neural foraminal narrowing.    C6-C7: No posterior disc bulge or spinal canal narrowing. Uncovertebral joint disease and facet arthropathy with severe bilateral neural foraminal narrowing.     C7-T1: No posterior disc bulge or spinal canal narrowing. No neural foraminal narrowing.       Impression    IMPRESSION:  1.  No evidence of acute fracture or subluxation of the cervical spine by CT imaging.  2.  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  3.  Degenerative cervical spondylosis as described above..   CT Thoracic Spine w/o Contrast    Narrative    EXAM: CT THORACIC SPINE W/O CONTRAST, CT LUMBAR SPINE W/O CONTRAST  LOCATION: [facility]  DATE: 5/26/2025    INDICATION: Trauma  COMPARISON: None.  TECHNIQUE:   1.  Thoracic spine CT without IV contrast. Dose reduction techniques were  used.  2.  Lumbar spine CT without IV contrast. Dose reduction techniques were used.    FINDINGS:  Thoracic spine CT:  No evidence of acute fracture or subluxation of the thoracic spine by CT imaging. The vertebral bodies of the thoracic spine have normal stature and alignment. The disc spaces are well-maintained. No significant degenerative changes.    No significant posterior disc bulge, spinal canal, or neural foraminal narrowing at any level within the thoracic spine.    Lumbar spine CT:  No evidence of acute fracture or subluxation of the thoracic spine by CT imaging. The vertebral bodies of the thoracic spine have normal stature and alignment. Multilevel degenerative disc disease of the thoracic spine with disc height loss, most   pronounced at L4/L5 and L5/S1.    The partially imaged intra-abdominal contents are unremarkable.    T12/L1:  No posterior disc bulge or spinal canal narrowing. No neural foraminal narrowing.    L1/L2:  No posterior disc bulge or spinal canal narrowing. No neural foraminal narrowing.    L2/L3:  No posterior disc bulge or spinal canal narrowing. No neural foraminal narrowing.    L3/L4:  Symmetric disc bulge and moderate facet arthropathy with mild spinal canal narrowing. Mild bilateral neural foraminal narrowing.      L4/L5:  Symmetric disc bulge and moderate facet arthropathy with mild spinal canal narrowing. Mild bilateral neural foraminal narrowing.     L5/S1: Symmetric disc bulge and moderate facet arthropathy without significant spinal canal narrowing. Severe bilateral neural foraminal narrowing.       Impression    IMPRESSION:    Thoracic Spine CT  1.  No evidence of acute fracture or subluxation of the thoracic spine by CT imaging.    Lumbar Spine CT:  1.  No evidence of acute fracture or subluxation of the lumbar spine by CT imaging.  2.  Degenerative lumbar spondylosis as described above.   CT Lumbar Spine w/o Contrast    Narrative    EXAM: CT THORACIC SPINE W/O CONTRAST, CT  LUMBAR SPINE W/O CONTRAST  LOCATION: [facility]  DATE: 5/26/2025    INDICATION: Trauma  COMPARISON: None.  TECHNIQUE:   1.  Thoracic spine CT without IV contrast. Dose reduction techniques were used.  2.  Lumbar spine CT without IV contrast. Dose reduction techniques were used.    FINDINGS:  Thoracic spine CT:  No evidence of acute fracture or subluxation of the thoracic spine by CT imaging. The vertebral bodies of the thoracic spine have normal stature and alignment. The disc spaces are well-maintained. No significant degenerative changes.    No significant posterior disc bulge, spinal canal, or neural foraminal narrowing at any level within the thoracic spine.    Lumbar spine CT:  No evidence of acute fracture or subluxation of the thoracic spine by CT imaging. The vertebral bodies of the thoracic spine have normal stature and alignment. Multilevel degenerative disc disease of the thoracic spine with disc height loss, most   pronounced at L4/L5 and L5/S1.    The partially imaged intra-abdominal contents are unremarkable.    T12/L1:  No posterior disc bulge or spinal canal narrowing. No neural foraminal narrowing.    L1/L2:  No posterior disc bulge or spinal canal narrowing. No neural foraminal narrowing.    L2/L3:  No posterior disc bulge or spinal canal narrowing. No neural foraminal narrowing.    L3/L4:  Symmetric disc bulge and moderate facet arthropathy with mild spinal canal narrowing. Mild bilateral neural foraminal narrowing.      L4/L5:  Symmetric disc bulge and moderate facet arthropathy with mild spinal canal narrowing. Mild bilateral neural foraminal narrowing.     L5/S1: Symmetric disc bulge and moderate facet arthropathy without significant spinal canal narrowing. Severe bilateral neural foraminal narrowing.       Impression    IMPRESSION:    Thoracic Spine CT  1.  No evidence of acute fracture or subluxation of the thoracic spine by CT imaging.    Lumbar Spine CT:  1.  No  evidence of acute fracture or subluxation of the lumbar spine by CT imaging.  2.  Degenerative lumbar spondylosis as described above.   CT Chest PE Abdomen Pelvis w Contrast    Narrative    EXAM: CT CHEST PE ABDOMEN PELVIS W CONTRAST  LOCATION: [facility]  DATE: 5/26/2025    INDICATION: Trauma  COMPARISON: None.  TECHNIQUE: CT chest pulmonary angiogram and routine CT abdomen pelvis with IV contrast. Arterial phase through the chest and venous phase through the abdomen and pelvis. Multiplanar reformats and MIP reconstructions were performed. Dose reduction   techniques were used.   CONTRAST: 102 mL Isovue 370    FINDINGS:  ANGIOGRAM CHEST: Pulmonary arteries are normal caliber and negative for pulmonary emboli. Thoracic aorta is negative for dissection. No CT evidence of right heart strain.     LUNGS AND PLEURA: Moderate to marked elevation right hemidiaphragm with right basilar atelectasis. Dependent atelectasis in the posterior lung bases. Respiratory motion obscures some details. No actionable pulmonary nodules. Otherwise, Lungs are clear.   No pleural effusions.    MEDIASTINUM/AXILLAE: No lymphadenopathy. No thoracic aortic aneurysms. No pericardial effusion. Esophagus unremarkable. Visualized thyroid unremarkable.    CORONARY ARTERY CALCIFICATION: Cannot evaluate.    HEPATOBILIARY: Diffuse hepatic steatosis. No significant mass. No bile duct dilatation. No calcified gallstones.    PANCREAS: No significant mass, duct dilatation, or inflammatory change.    SPLEEN: Normal size.    ADRENAL GLANDS: No significant nodules.    KIDNEYS/BLADDER: The bilateral kidneys enhance symmetrically without evidence for hydronephrosis or pyelonephritis. No renal masses. No renal calculi. The bilateral ureters and urinary bladder are unremarkable.    BOWEL:  Nonspecific nonobstructive bowel gas pattern. No inflammatory changes. Appendix not seen but no definite evidence for appendicitis. Further  management of suspected appendicitis should be based on physical examination and clinical judgment.    LYMPH NODES: No lymphadenopathy.    VASCULATURE: No abdominal aortic aneurysm. Mild vascular calcifications abdominal aorta.    PELVIC ORGANS: No pelvic masses or free fluid.    MUSCULOSKELETAL: Mild to moderate spondylosis. No definite acute fractures identified. No definite inguinal hernia. No ventral hernia.      Impression    IMPRESSION:  1.  No evidence for pulmonary embolism.  2.  Moderate to marked elevation right hemidiaphragm with right basilar atelectasis.  3.  No acute findings in the abdomen or pelvis.  4.  Hepatic steatosis.         Assessment:  1. Closed displaced fracture of right clavicle, unspecified part of clavicle, initial encounter        Plan:  Discussed the assessment with the patient.  Follow up: 3 weeks  Pain under better control, not requiring much OTC medication  Has weaned from shoulder immobilizer and feels better out of it  Various imaging reviewed today including new images  CT and XR images independently visualized and reviewed with patient today in clinic  Acute mildly foreshortened and moderately displaced, comminuted midshaft clavicle fracture appears in more anatomic alignment today, and it is feeling better as well  Contusion only for the left shoulder which remains highly function and overall reassuring on exam  Pain control measures reviewed, he has been ok with Tylenol, reviewed potential use of Voltaren gel  OK to use sling/immobilizer for now, continue to work on passive and active gentle ROM, reviewed HEP today  Likely plan for PT after next visit  Reviewed anticipation of 3 mo recovery, and will more completely assess RTC when fracture improves and functional testing is more realistic  Also reporting that he lost consciousness with the fall, and has concussion like sx since  Reviewed general principles of relative brain rest, concussion  referral placed last  visit, has appt tomorrow  Advised no driving for now  Supportive care reviewed  All questions were answered today  Contact us with additional questions or concerns  Signs and sx of concern reviewed      Osvaldo Davis DO, RONALD  Sports Medicine Physician  [facility]        Disclaimer: This note consists of symbols derived from keyboarding, dictation and/or voice recognition software. As a result, there may be errors in the script that have gone undetected. Please consider this when interpreting information found in this chart.

## 2025-07-15 ENCOUNTER — TRANSFERRED RECORDS (OUTPATIENT)
Dept: HEALTH INFORMATION MANAGEMENT | Facility: CLINIC | Age: 65
End: 2025-07-15
Payer: COMMERCIAL